# Patient Record
Sex: MALE | Race: NATIVE HAWAIIAN OR OTHER PACIFIC ISLANDER
[De-identification: names, ages, dates, MRNs, and addresses within clinical notes are randomized per-mention and may not be internally consistent; named-entity substitution may affect disease eponyms.]

---

## 2020-11-26 ENCOUNTER — HOSPITAL ENCOUNTER (EMERGENCY)
Dept: HOSPITAL 5 - ED | Age: 25
Discharge: HOME | End: 2020-11-26
Payer: SELF-PAY

## 2020-11-26 VITALS — SYSTOLIC BLOOD PRESSURE: 133 MMHG | DIASTOLIC BLOOD PRESSURE: 59 MMHG
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
## 2020-11-26 DIAGNOSIS — Z79.899: ICD-10-CM

## 2020-11-26 DIAGNOSIS — T14.8XXA: ICD-10-CM

## 2020-11-26 DIAGNOSIS — S61.217A: Primary | ICD-10-CM

## 2020-11-26 PROCEDURE — 90471 IMMUNIZATION ADMIN: CPT

## 2020-11-26 PROCEDURE — 73130 X-RAY EXAM OF HAND: CPT

## 2020-11-26 PROCEDURE — 90715 TDAP VACCINE 7 YRS/> IM: CPT

## 2020-11-26 PROCEDURE — 96372 THER/PROPH/DIAG INJ SC/IM: CPT

## 2020-11-26 PROCEDURE — 99283 EMERGENCY DEPT VISIT LOW MDM: CPT

## 2020-11-26 NOTE — XRAY REPORT
. LEFT HAND 4 VIEW(S)



INDICATION / CLINICAL INFORMATION:

hand got stuck in 



COMPARISON:

None available.

 

FINDINGS:



BONES / JOINT(S): No acute fracture or subluxation. No significant arthritis. Carpal arcs are intact.




SOFT TISSUES: Soft tissue laceration is noted of the distal aspect of the small finger with mild soft
 tissue swelling.



ADDITIONAL FINDINGS: None.







Signer Name: Aaron Reeves MD 

Signed: 11/26/2020 4:39 PM

Workstation Name: [id number]

## 2020-11-26 NOTE — EMERGENCY DEPARTMENT REPORT
- General


Chief Complaint: Laceration/Recheck/Suture


Stated Complaint: LT PINKY FINGER INJURY


Time Seen by Provider: 11/26/20 15:42


Source: patient


Mode of arrival: Ambulatory


Limitations: No Limitations





- History of Present Illness


Initial Comments: 





Patient is a 25-year-old male presents emergency room complaints of a laceration

to the left hand that occurred just prior to arrival.  Patient states that he 

was sharpening a knife on a metal electric sharpener and he turned to talk with 

someone in his hand got stuck in the sharpener and caused a laceration.  He 

states that there was a small pulsating area of bleeding but it has improved 

once gauze and dressing was placed in triage.  He denies any numbness or 

weakness.  He is still able to move the hand.  He is unsure of his last tetanus 

immunization.  No past medical history.  No allergies to medications.





- Related Data


                                  Previous Rx's











 Medication  Instructions  Recorded  Last Taken  Type


 


Acetaminophen/Codeine [Tylenol 1 tab PO Q6H PRN #12 tab 11/26/20 Unknown Rx





/Codeine # 3 tab]    


 


Bacitracin Zinc Oint [Antibiotic 1 applicatio TP BID #4 tube 11/26/20 Unknown Rx





Oint]    


 


Ibuprofen [Motrin 600 MG tab] 600 mg PO Q8H PRN #14 tablet 11/26/20 Unknown Rx


 


Sulfamethoxazole/Trimethoprim 1 each PO BID 7 Days #14 tablet 11/26/20 Unknown 

Rx





[Bactrim DS TAB]    











                                    Allergies











Allergy/AdvReac Type Severity Reaction Status Date / Time


 


No Known Allergies Allergy   Unverified 11/26/20 15:30














ED Review of Systems


ROS: 


Stated complaint: LT PINKY FINGER INJURY


Other details as noted in HPI





Comment: All other systems reviewed and negative





ED Past Medical Hx





- Past Medical History


Previous Medical History?: No





- Social History


Smoking Status: Never Smoker


Substance Use Type: None





- Medications


Home Medications: 


                                Home Medications











 Medication  Instructions  Recorded  Confirmed  Last Taken  Type


 


Acetaminophen/Codeine [Tylenol 1 tab PO Q6H PRN #12 tab 11/26/20  Unknown Rx





/Codeine # 3 tab]     


 


Bacitracin Zinc Oint [Antibiotic 1 applicatio TP BID #4 tube 11/26/20  Unknown 

Rx





Oint]     


 


Ibuprofen [Motrin 600 MG tab] 600 mg PO Q8H PRN #14 tablet 11/26/20  Unknown Rx


 


Sulfamethoxazole/Trimethoprim 1 each PO BID 7 Days #14 tablet 11/26/20  Unknown 

Rx





[Bactrim DS TAB]     














ED Physical Exam





- General


Limitations: No Limitations


General appearance: alert, in no apparent distress





- Head


Head exam: Present: atraumatic, normocephalic





- Eye


Eye exam: Present: normal appearance





- ENT


ENT exam: Present: mucous membranes moist





- Respiratory


Respiratory exam: Absent: respiratory distress, accessory muscle use





- Neurological Exam


Neurological exam: Present: alert, oriented X3





- Psychiatric


Psychiatric exam: Present: normal affect, normal mood





- Skin


Skin exam: Present: warm, dry, other (3 cm skin avulsion present to the left 

medial little finger, there is a small superificial vessel which has clotted 

off, there is no active bleeding, there are two small 1 cm superficial 

lacerations to the dorsal surface of the left little finger, no subcutaneous fat

involvement, no muscle/tendon involvement, 1 cm avulsion present to the left 

medial hand, no active bleeding, FROM of the left wrist, hand, digits, 

neurovascularly intact)





ED Course


                                   Vital Signs











  11/26/20





  15:22


 


Temperature 98 F


 


Pulse Rate 80


 


Respiratory 18





Rate 


 


Blood Pressure 120/70


 


O2 Sat by Pulse 98





Oximetry 














ED Medical Decision Making





- Radiology Data


Radiology results: report reviewed





Ordering Physician: HUNTER LAO 


 Date of Service: 11/26/20 


 Procedure(s): XR hand 3+V LT 


 Accession Number(s): [id number] 





 cc: HUNTER LAO 





 Fluoro Time In Minutes: 





 . LEFT HAND 4 VIEW(S) 





INDICATION / CLINICAL INFORMATION: 


hand got stuck in  





COMPARISON: 


None available. 





FINDINGS: 





BONES / JOINT(S): No acute fracture or subluxation. No significant arthritis. 

Carpal arcs are 


 intact. 





SOFT TISSUES: Soft tissue laceration is noted of the distal aspect of the small 

finger with mild 


 soft tissue swelling. 





ADDITIONAL FINDINGS: None. 











Signer Name: Kandice Shrestha MD 


Signed: 11/26/2020 4:39 PM 


Workstation Name: [id number] 








 Transcribed By:  


 Dictated By: KANDICE SHRESTHA 


 Electronically Authenticated By: KANDICE SHRESTHA 


 Signed Date/Time: 11/26/20 1639 











 DD/DT: 11/26/20 1638 


 TD/TT: 





- Medical Decision Making





Patient is a 25-year-old male presents emergency room complaints of a laceration

to the left hand that occurred just prior to arrival.  Patient states that he 

was sharpening a knife on a metal electric sharpener and he turned to talk with 

someone in his hand got stuck in the sharpener and caused a laceration.  He 

states that there was a small pulsating area of bleeding but it has improved 

once gauze and dressing was placed in triage.  He denies any numbness or 

weakness.  He is still able to move the hand.  He is unsure of his last tetanus 

immunization.  No past medical history.  No allergies to medications. vitals are

normal. on exam: 3 cm skin avulsion present to the left medial little finger, 

there is a small superificial vessel which has clotted off, there is no active 

bleeding, there are two small 1 cm superficial lacerations to the dorsal surface

of the left little finger, no subcutaneous fat involvement, no muscle/tendon 

involvement, 1 cm avulsion present to the left medial hand, no active bleeding, 

FROM of the left wrist, hand, digits, neurovascularly intact. no repairable 

laceration at this time. mostly skin avulsions from being stuck in machinery. no

areas of wound dehiscence. the small lacerations are superficial. XR hand: BONES

/ JOINT(S): No acute fracture or subluxation. No significant arthritis. Carpal 

arcs are  intact. SOFT TISSUES: Soft tissue laceration is noted of the distal 

aspect of the small finger with mild  soft tissue swelling. ADDITIONAL FINDINGS:

None.  Wound irrigated with 500 mL of saline and thoroughly scrubbed with 

Betadine, patient's hand was soaked for 30 minutes.  Wound care performed by 

nurse with bacitracin and ointment and nonadherent gauze.  Patient given Tdap 

and cefazolin IM while in the emergency department.  Patient given pain 

medication as he did not drive.  Discussed wound care with patient.  Patient 

given prescription for Bactrim, ibuprofen, Tylenol with codeine, bacitracin 

ointment. advised pt Please use medication as prescribed.  Please keep area 

clean, dry, covered.  May wash with antibacterial soap and water gently twice a 

day and pat dry.  No hot tub, no pool, no soaking in water.  Showering is fine. 

Follow-up with your primary care doctor for reexamination.  Return to emergency 

room immediately for any new or worsening symptoms.


Critical care attestation.: 


If time is entered above; I have spent that time in minutes in the direct care 

of this critically ill patient, excluding procedure time.








ED Disposition


Clinical Impression: 


 Skin avulsion, Laceration





Disposition: DC-01 TO HOME OR SELFCARE


Is pt being admited?: No


Does the pt Need Aspirin: No


Condition: Stable


Instructions:  Deep Skin Avulsion


Additional Instructions: 


Please use medication as prescribed.  Please keep area clean, dry, covered.  May

wash with antibacterial soap and water gently twice a day and pat dry.  No hot 

tub, no pool, no soaking in water.  Showering is fine.  Follow-up with your 

primary care doctor for reexamination.  Return to emergency room immediately for

any new or worsening symptoms.


Prescriptions: 


Bacitracin Zinc Oint [Antibiotic Oint] 1 applicatio TP BID #4 tube


Sulfamethoxazole/Trimethoprim [Bactrim DS TAB] 1 each PO BID 7 Days #14 tablet


Ibuprofen [Motrin 600 MG tab] 600 mg PO Q8H PRN #14 tablet


 PRN Reason: Pain


Acetaminophen/Codeine [Tylenol /Codeine # 3 tab] 1 tab PO Q6H PRN #12 tab


 PRN Reason: pain


Referrals: 


TAE WELCH MD [Staff Physician] - 2-3 Days


[facility] [Provider Group] - 2-3 Days


Time of Disposition: 16:38


Print Language: ENGLISH